# Patient Record
Sex: MALE | Race: WHITE | NOT HISPANIC OR LATINO | Employment: FULL TIME | URBAN - METROPOLITAN AREA
[De-identification: names, ages, dates, MRNs, and addresses within clinical notes are randomized per-mention and may not be internally consistent; named-entity substitution may affect disease eponyms.]

---

## 2021-04-01 ENCOUNTER — TELEPHONE (OUTPATIENT)
Dept: SCHEDULING | Facility: IMAGING CENTER | Age: 26
End: 2021-04-01

## 2021-04-06 ENCOUNTER — OFFICE VISIT (OUTPATIENT)
Dept: MEDICAL GROUP | Facility: MEDICAL CENTER | Age: 26
End: 2021-04-06
Payer: COMMERCIAL

## 2021-04-06 VITALS
DIASTOLIC BLOOD PRESSURE: 70 MMHG | HEIGHT: 73 IN | WEIGHT: 167 LBS | OXYGEN SATURATION: 97 % | TEMPERATURE: 97.9 F | SYSTOLIC BLOOD PRESSURE: 114 MMHG | HEART RATE: 65 BPM | BODY MASS INDEX: 22.13 KG/M2

## 2021-04-06 DIAGNOSIS — G43.009 MIGRAINE WITHOUT AURA AND WITHOUT STATUS MIGRAINOSUS, NOT INTRACTABLE: ICD-10-CM

## 2021-04-06 DIAGNOSIS — G89.29 CHRONIC BILATERAL LOW BACK PAIN WITHOUT SCIATICA: ICD-10-CM

## 2021-04-06 DIAGNOSIS — R05.9 COUGH: ICD-10-CM

## 2021-04-06 DIAGNOSIS — M54.50 CHRONIC BILATERAL LOW BACK PAIN WITHOUT SCIATICA: ICD-10-CM

## 2021-04-06 PROCEDURE — 99214 OFFICE O/P EST MOD 30 MIN: CPT | Performed by: PHYSICIAN ASSISTANT

## 2021-04-06 RX ORDER — AZITHROMYCIN 250 MG/1
TABLET, FILM COATED ORAL
Qty: 6 TABLET | Refills: 0 | Status: SHIPPED | OUTPATIENT
Start: 2021-04-06 | End: 2023-11-30

## 2021-04-06 RX ORDER — BENZONATATE 100 MG/1
100 CAPSULE ORAL 3 TIMES DAILY PRN
Qty: 30 CAPSULE | Refills: 0 | Status: SHIPPED | OUTPATIENT
Start: 2021-04-06 | End: 2023-11-30

## 2021-04-06 ASSESSMENT — PATIENT HEALTH QUESTIONNAIRE - PHQ9: CLINICAL INTERPRETATION OF PHQ2 SCORE: 0

## 2021-04-06 NOTE — ASSESSMENT & PLAN NOTE
Chronic condition: Patient reports falling off a ladder many years ago.  Gets intermittent back pains.  Denies numbness or tingling down his extremities or weakness with gait.  Has had chiropractic care in the past which helps his symptoms.  Symptoms are worse after work.  He works in air conditioning and heating.  Rest, ice and alternating heat help improve

## 2021-04-06 NOTE — ASSESSMENT & PLAN NOTE
Patient reports approximately 2-week history of productive cough.  Was checked for Covid approximately 1 week ago and was negative.  Scheduled to be seen secondary to coughing up originally brown sputum and now green.  States the cough is slightly been improved.  Does get some shortness of breath associated with the cough.  Had the sore throat originally in the beginning but that has resolved.  Denies fever, chills, nauseous and vomiting.  Denies known sick contacts.

## 2021-04-06 NOTE — PROGRESS NOTES
Subjective:   Francesco Hamilton is a 25 y.o. male here today for  Persistent cough    Cough  Acute: Patient reports approximately 2-week history of productive cough.  Was checked for Covid approximately 1 week ago and was negative.  Scheduled to be seen secondary to coughing up originally brown sputum and now green.  States the cough is slightly been improved.  Does get some shortness of breath associated with the cough.  Had the sore throat originally in the beginning but that has resolved.  Denies fever, chills, nauseous and vomiting.  Denies known sick contacts.    Chronic bilateral low back pain without sciatica  Chronic condition: Patient reports falling off a ladder many years ago.  Gets intermittent back pains.  Denies numbness or tingling down his extremities or weakness with gait.  Has had chiropractic care in the past which helps his symptoms.  Symptoms are worse after work.  He works in air conditioning and heating.  Rest, ice and alternating heat help improve         Current medicines (including changes today)  Current Outpatient Medications   Medication Sig Dispense Refill   • azithromycin (ZITHROMAX) 250 MG Tab Take two tablets on day one, then on tablet the following four days 6 tablet 0   • benzonatate (TESSALON) 100 MG Cap Take 1 capsule by mouth 3 times a day as needed for Cough. 30 capsule 0     No current facility-administered medications for this visit.     He  has a past medical history of Migraine. He also has no past medical history of Allergy, unspecified not elsewhere classified.  Patient has no known allergies.     Social History and Family History were reviewed and updated.    ROS   No headaches, chest pain, fevers, abdominal pain, nausea, or vomiting.  All other systems were reviewed and are negative or noted as positive in the HPI.       Objective:     /70 (BP Location: Right arm, Patient Position: Sitting, BP Cuff Size: Adult)   Pulse 65   Temp 36.6 °C (97.9 °F)   Ht 1.854 m  "(6' 1\")   Wt 75.8 kg (167 lb)   SpO2 97%  Body mass index is 22.03 kg/m².     Physical Exam:  Constitutional: ANO x3, no acute distress, well-nourished, well-hydrated   Skin: Warm, dry, good turgor, no rashes in visible areas.  HEENT: Is normocephalic atraumatic, good PERRLA, extraocular movements intact, TMs and oropharynx are clear with good dentition   Neck: Soft and supple, trachea midline, no masses.  No thyroid megaly or cervical lymphadenopathy noted  Cardiovascular: Regular rate and rhythm.  Normal S1 and 2, no murmurs, rubs or gallops.  No edema noted  Lungs: Clear to auscultation bilaterally.  No wheezes, rales or rhonchi.  Good inspiratory and expiratory breath sounds  Abdomen: Soft, non-tender, no masses.  No hepatosplenomegaly.  Negative Castellano's  Psych: Alert and oriented x3, normal affect and mood.  Neuro: Cranial nerves II through XII were assessed and intact.  Normal gait, normal cerebellar function noted  Musculoskeletal: Full range of motion, good strength against resistance    Clinical Course/Lab Analysis:    None today    Assessment and Plan:   The following treatment plan was discussed.  Signs and symptoms for which to return were discussed with patient at length.  Patient verbalized understanding.    1. Cough  Unstable:  - azithromycin (ZITHROMAX) 250 MG Tab; Take two tablets on day one, then on tablet the following four days  Dispense: 6 tablet; Refill: 0  - benzonatate (TESSALON) 100 MG Cap; Take 1 capsule by mouth 3 times a day as needed for Cough.  Dispense: 30 capsule; Refill: 0    Discussed likely viral etiology at length.  Patient is not wheezing suggesting that this is not bronchitis.  However it has been 2 weeks since symptoms are persistent.  Therefore will cover with a macrolide for an atypical bacterial infection.  If symptoms persist or worsen despite treatment please contact me and will order x-ray at that time To Rule out CAP    2. Migraine without aura and without status " migrainosus, not intractable  Chronic: stable  Patient currently takes anti-inflammatory and occasionally smokes marijuana to alleviate symptoms.  Does not wish for any medical therapy at this time other than the above    3. Chronic bilateral low back pain without sciatica  Stable  - REFERRAL TO CHIROPRACTIC  Recommend alternating ice and heat.  Exercise as tolerated      Followup: Return in about 6 months (around 10/6/2021) for annual preventative.     Please note that this dictation was created using voice recognition software. I have made every reasonable attempt to correct obvious errors, but I expect that there are errors of grammar and possibly content that I did not discover before finalizing the note.

## 2023-11-30 ENCOUNTER — OFFICE VISIT (OUTPATIENT)
Dept: URGENT CARE | Facility: PHYSICIAN GROUP | Age: 28
End: 2023-11-30
Payer: COMMERCIAL

## 2023-11-30 VITALS
HEART RATE: 73 BPM | TEMPERATURE: 98.8 F | BODY MASS INDEX: 23.03 KG/M2 | DIASTOLIC BLOOD PRESSURE: 68 MMHG | OXYGEN SATURATION: 97 % | HEIGHT: 72 IN | RESPIRATION RATE: 16 BRPM | WEIGHT: 170 LBS | SYSTOLIC BLOOD PRESSURE: 126 MMHG

## 2023-11-30 DIAGNOSIS — S61.219A LACERATION OF SKIN OF FINGER, INITIAL ENCOUNTER: ICD-10-CM

## 2023-11-30 PROCEDURE — 3078F DIAST BP <80 MM HG: CPT | Performed by: PHYSICIAN ASSISTANT

## 2023-11-30 PROCEDURE — 3074F SYST BP LT 130 MM HG: CPT | Performed by: PHYSICIAN ASSISTANT

## 2023-11-30 PROCEDURE — 12001 RPR S/N/AX/GEN/TRNK 2.5CM/<: CPT | Performed by: PHYSICIAN ASSISTANT

## 2023-11-30 RX ORDER — AMOXICILLIN AND CLAVULANATE POTASSIUM 875; 125 MG/1; MG/1
1 TABLET, FILM COATED ORAL 2 TIMES DAILY
Qty: 14 TABLET | Refills: 0 | Status: SHIPPED | OUTPATIENT
Start: 2023-11-30 | End: 2023-12-07

## 2023-11-30 ASSESSMENT — ENCOUNTER SYMPTOMS
NEUROLOGICAL NEGATIVE: 1
MUSCULOSKELETAL NEGATIVE: 1

## 2023-11-30 NOTE — PROGRESS NOTES
Subjective:     Francesco Hamilton  is a 28 y.o. male who presents for Laceration (L 3rd digit, cut at work by metal, pt does not want to do WC )       He presents today after suffering a laceration over his left third finger that occurred when he was working with sheet metal at work.  He denies filing a work comp claim for the injury.  Since the time of injury he has been experiencing bleeding over the laceration, does have finger flexion extension functionality of the finger but has limited flexion due to the area of the laceration being over the joint.  Denies distal numbness or tingling.  Last tetanus was 2020.     Review of Systems   Musculoskeletal: Negative.    Skin:         Laceration of skin over the left third digit   Neurological: Negative.       No Known Allergies  Past Medical History:   Diagnosis Date    Migraine         Objective:   /68   Pulse 73   Temp 37.1 °C (98.8 °F)   Resp 16   Ht 1.829 m (6')   Wt 77.1 kg (170 lb)   SpO2 97%   BMI 23.06 kg/m²   Physical Exam  Vitals and nursing note reviewed.   Constitutional:       General: He is not in acute distress.     Appearance: He is not ill-appearing or toxic-appearing.   HENT:      Head: Normocephalic.      Nose: No rhinorrhea.   Eyes:      General: No scleral icterus.     Conjunctiva/sclera: Conjunctivae normal.   Pulmonary:      Effort: Pulmonary effort is normal. No respiratory distress.      Breath sounds: No stridor.   Musculoskeletal:      Cervical back: Neck supple.      Comments: Examination of the left hand does reveal laceration over the dorsal aspect of the left third digit on the PIP joint.  Laceration is 2 cm in length and extends through the epidermal and dermal layers into the subcutaneous tissue.  No muscle or fascial tissue visible.  No tendons visible.  Finger flexion extension is maintained.  Distal capillary refill less than 2 seconds.  No foreign bodies visible.   Neurological:      Mental Status: He is alert and  oriented to person, place, and time.   Psychiatric:         Mood and Affect: Mood normal.         Behavior: Behavior normal.         Thought Content: Thought content normal.         Judgment: Judgment normal.           Diagnostic testing: None    Assessment/Plan:     Encounter Diagnoses   Name Primary?    Laceration of skin of finger, initial encounter      Procedure: Laceration Repair of left third digit  -Risks including bleeding, nerve damage, infection, and poor cosmetic outcome discussed. Benefits and alternatives discussed.   -Clean technique with sterile instruments and suture used  -Local anesthesia with 1% lidocaine without epinephrine  -Closed with #3 4-0 Nylon interrupted sutures with good wound approximation  -Polysporin and dressing placed  -Patient tolerated well  -Patient will follow up in 10 days for suture removal       Plan for care for today's complaint includes performing laceration repair, please see above-stated procedure details for more information.  Patient had flexion and extension functionality of his finger prior to and following laceration repair.  Wound and dressing care were discussed.  Start patient on Augmentin due to laceration being directly over the PIP joint.  Signs and symptoms of infection were discussed with the patient today.  No foreign bodies were visible on exam today.  Return in 10 days for suture removal..  Prescription for Augmentin provided.    See AVS Instructions below for written guidance provided to patient on after-visit management and care in addition to our verbal discussion during the visit.    Please note that this dictation was created using voice recognition software. I have attempted to correct all errors, but there may be sound-alike, spelling, grammar and possibly content errors that I did not discover before finalizing the note.    Blu Simms PA-C

## 2024-02-22 ENCOUNTER — OFFICE VISIT (OUTPATIENT)
Dept: URGENT CARE | Facility: PHYSICIAN GROUP | Age: 29
End: 2024-02-22
Payer: COMMERCIAL

## 2024-02-22 VITALS
TEMPERATURE: 98.2 F | RESPIRATION RATE: 16 BRPM | SYSTOLIC BLOOD PRESSURE: 126 MMHG | OXYGEN SATURATION: 100 % | BODY MASS INDEX: 23.3 KG/M2 | DIASTOLIC BLOOD PRESSURE: 84 MMHG | WEIGHT: 172 LBS | HEIGHT: 72 IN | HEART RATE: 66 BPM

## 2024-02-22 DIAGNOSIS — J01.90 ACUTE NON-RECURRENT SINUSITIS, UNSPECIFIED LOCATION: ICD-10-CM

## 2024-02-22 DIAGNOSIS — B33.8 RSV INFECTION: ICD-10-CM

## 2024-02-22 DIAGNOSIS — R05.1 ACUTE COUGH: ICD-10-CM

## 2024-02-22 DIAGNOSIS — R06.2 WHEEZING: ICD-10-CM

## 2024-02-22 DIAGNOSIS — J22 LRTI (LOWER RESPIRATORY TRACT INFECTION): ICD-10-CM

## 2024-02-22 LAB
FLUAV RNA SPEC QL NAA+PROBE: NEGATIVE
FLUBV RNA SPEC QL NAA+PROBE: NEGATIVE
RSV RNA SPEC QL NAA+PROBE: POSITIVE
SARS-COV-2 RNA RESP QL NAA+PROBE: NEGATIVE

## 2024-02-22 PROCEDURE — 99213 OFFICE O/P EST LOW 20 MIN: CPT | Performed by: NURSE PRACTITIONER

## 2024-02-22 PROCEDURE — 3074F SYST BP LT 130 MM HG: CPT | Performed by: NURSE PRACTITIONER

## 2024-02-22 PROCEDURE — 0241U POCT CEPHEID COV-2, FLU A/B, RSV - PCR: CPT | Performed by: NURSE PRACTITIONER

## 2024-02-22 PROCEDURE — 3079F DIAST BP 80-89 MM HG: CPT | Performed by: NURSE PRACTITIONER

## 2024-02-22 RX ORDER — ALBUTEROL SULFATE 90 UG/1
1-2 AEROSOL, METERED RESPIRATORY (INHALATION) EVERY 4 HOURS PRN
Qty: 8 G | Refills: 0 | Status: SHIPPED | OUTPATIENT
Start: 2024-02-22

## 2024-02-22 RX ORDER — DOXYCYCLINE HYCLATE 100 MG
100 TABLET ORAL EVERY 12 HOURS
Qty: 14 TABLET | Refills: 0 | Status: SHIPPED | OUTPATIENT
Start: 2024-02-22 | End: 2024-02-29

## 2024-02-22 RX ORDER — FLUTICASONE PROPIONATE 50 MCG
SPRAY, SUSPENSION (ML) NASAL
Qty: 9.1 ML | Refills: 0 | Status: SHIPPED | OUTPATIENT
Start: 2024-02-22

## 2024-02-22 ASSESSMENT — ENCOUNTER SYMPTOMS
SINUS PAIN: 1
SPUTUM PRODUCTION: 1
FEVER: 1
HEADACHES: 1
HEMOPTYSIS: 0
COUGH: 1
SENSORY CHANGE: 0
RHINORRHEA: 1
WHEEZING: 1
SHORTNESS OF BREATH: 0
DIAPHORESIS: 1
WEAKNESS: 0

## 2024-02-22 ASSESSMENT — VISUAL ACUITY: OU: 1

## 2024-02-23 NOTE — PROGRESS NOTES
Subjective:     Francesco Hamilton is a 28 y.o. male who presents for Cough, Fever, Headache, and Otalgia       Cough  This is a new problem. The problem has been gradually worsening. The cough is Productive of sputum. Associated symptoms include ear pain, a fever, headaches, rhinorrhea and wheezing. Pertinent negatives include no hemoptysis or shortness of breath. He has tried OTC cough suppressant for the symptoms. Improvement on treatment: Mucinex. There is no history of asthma.   Fever   This is a new problem. The problem has been gradually worsening. Associated symptoms include congestion, coughing, ear pain, headaches and wheezing.   Headache  Otalgia   There is pain in the right ear. This is a new problem. The problem has been gradually worsening. Associated symptoms include coughing, headaches and rhinorrhea.     Friday through Saturday, patient started to develop new respiratory symptoms.  See ROS.  Reports daughter had RSV.  Reports chief concern of new onset of sweats the past 2 days.  Reports wife thought he wet the bed.  Coughing up yellow phlegm.  Denies foreign travel or hemoptysis.  Smoker x 5 years.    Review of Systems   Constitutional:  Positive for diaphoresis and fever. Negative for malaise/fatigue.   HENT:  Positive for congestion, ear pain, rhinorrhea and sinus pain.    Respiratory:  Positive for cough, sputum production (Yellow) and wheezing. Negative for hemoptysis and shortness of breath.    Neurological:  Positive for headaches. Negative for sensory change and weakness.   All other systems reviewed and are negative.    Refer to HPI for additional details.    During this visit, appropriate PPE was worn, and hand hygiene was performed.    PMH:  has a past medical history of Migraine.    He has no past medical history of Allergy, unspecified not elsewhere classified.    MEDS:   Current Outpatient Medications:     doxycycline (VIBRAMYCIN) 100 MG Tab, Take 1 Tablet by mouth every 12 hours for  7 days., Disp: 14 Tablet, Rfl: 0    albuterol 108 (90 Base) MCG/ACT Aero Soln inhalation aerosol, Inhale 1-2 Puffs every four hours as needed for Shortness of Breath (and/or wheezing)., Disp: 8 g, Rfl: 0    fluticasone (FLONASE) 50 MCG/ACT nasal spray, 1 spray in each nostril 2 times a day, Disp: 9.1 mL, Rfl: 0    ALLERGIES: No Known Allergies  SURGHX: History reviewed. No pertinent surgical history.  SOCHX:  reports that he has never smoked. He has never used smokeless tobacco. He reports that he does not currently use alcohol. He reports current drug use. Frequency: 3.00 times per week. Drug: Marijuana.    FH: Per HPI as applicable/pertinent.      Objective:     /84   Pulse 66   Temp 36.8 °C (98.2 °F)   Resp 16   Ht 1.829 m (6')   Wt 78 kg (172 lb)   SpO2 100%   BMI 23.33 kg/m²     Physical Exam  Nursing note reviewed.   Constitutional:       General: He is not in acute distress.     Appearance: He is well-developed. He is not ill-appearing, toxic-appearing or diaphoretic.   HENT:      Head: Normocephalic.      Right Ear: Tympanic membrane, ear canal and external ear normal.      Left Ear: Tympanic membrane, ear canal and external ear normal.      Nose: Congestion present.      Right Sinus: Maxillary sinus tenderness present.      Mouth/Throat:      Mouth: Mucous membranes are moist.      Pharynx: Oropharynx is clear.   Eyes:      General: Vision grossly intact.      Extraocular Movements: Extraocular movements intact.      Conjunctiva/sclera: Conjunctivae normal.   Neck:      Trachea: Phonation normal.   Cardiovascular:      Rate and Rhythm: Normal rate and regular rhythm.      Heart sounds: Normal heart sounds.   Pulmonary:      Effort: Pulmonary effort is normal. No respiratory distress.      Breath sounds: No stridor. Wheezing present. No decreased breath sounds, rhonchi or rales.   Musculoskeletal:         General: No deformity. Normal range of motion.      Cervical back: Normal range of motion.    Skin:     General: Skin is warm and dry.      Coloration: Skin is not pale.   Neurological:      Mental Status: He is alert and oriented to person, place, and time.      Motor: No weakness.   Psychiatric:         Behavior: Behavior normal. Behavior is cooperative.     POCT Cepheid CoV-2, Flu A/B, RSV by PCR: positive RSV      Assessment/Plan:     1. Acute cough  - POCT CEPHEID COV-2, FLU A/B, RSV - PCR    2. Wheezing  - albuterol 108 (90 Base) MCG/ACT Aero Soln inhalation aerosol; Inhale 1-2 Puffs every four hours as needed for Shortness of Breath (and/or wheezing).  Dispense: 8 g; Refill: 0    3. Acute non-recurrent sinusitis, unspecified location  - doxycycline (VIBRAMYCIN) 100 MG Tab; Take 1 Tablet by mouth every 12 hours for 7 days.  Dispense: 14 Tablet; Refill: 0  - fluticasone (FLONASE) 50 MCG/ACT nasal spray; 1 spray in each nostril 2 times a day  Dispense: 9.1 mL; Refill: 0    4. LRTI (lower respiratory tract infection)  - doxycycline (VIBRAMYCIN) 100 MG Tab; Take 1 Tablet by mouth every 12 hours for 7 days.  Dispense: 14 Tablet; Refill: 0    5. RSV infection    Discussed likely underlying self-limiting viral etiology and expected course and duration of illness. Vital signs stable, afebrile, no acute distress at this time. Advised sweats are a normal symptom of the immune system's response to infection.     Emphasize supportive measures, rest, fluids, and symptom management with over-the-counter medication as needed such as Mucinex, Tylenol, and ibuprofen. Rx as above sent electronically. Cover for secondary bacterial sinusitis and LRTI.    Standard precautions/mask/wash hands.    Monitor. Warning signs reviewed. Return precautions advised.     Differential diagnosis, natural history, supportive care, over-the-counter symptom management per 's instructions, close monitoring, and indications for immediate follow-up discussed.     All questions answered. Patient agrees with the plan of  care.    Discharge summary provided via [facility].